# Patient Record
Sex: MALE | Race: BLACK OR AFRICAN AMERICAN | NOT HISPANIC OR LATINO | Employment: UNEMPLOYED | URBAN - METROPOLITAN AREA
[De-identification: names, ages, dates, MRNs, and addresses within clinical notes are randomized per-mention and may not be internally consistent; named-entity substitution may affect disease eponyms.]

---

## 2017-11-09 ENCOUNTER — HOSPITAL ENCOUNTER (EMERGENCY)
Facility: MEDICAL CENTER | Age: 22
End: 2017-11-09
Attending: EMERGENCY MEDICINE

## 2017-11-09 VITALS
OXYGEN SATURATION: 97 % | DIASTOLIC BLOOD PRESSURE: 80 MMHG | HEART RATE: 73 BPM | WEIGHT: 216.05 LBS | SYSTOLIC BLOOD PRESSURE: 135 MMHG | BODY MASS INDEX: 25.51 KG/M2 | HEIGHT: 77 IN | RESPIRATION RATE: 16 BRPM | TEMPERATURE: 98.4 F

## 2017-11-09 DIAGNOSIS — N34.2 URETHRITIS: ICD-10-CM

## 2017-11-09 DIAGNOSIS — Z71.1 CONCERN ABOUT STD IN MALE WITHOUT DIAGNOSIS: ICD-10-CM

## 2017-11-09 LAB
APPEARANCE UR: CLEAR
BILIRUB UR QL STRIP.AUTO: NEGATIVE
COLOR UR: YELLOW
GLUCOSE UR STRIP.AUTO-MCNC: NEGATIVE MG/DL
KETONES UR STRIP.AUTO-MCNC: NEGATIVE MG/DL
LEUKOCYTE ESTERASE UR QL STRIP.AUTO: NEGATIVE
MICRO URNS: NORMAL
NITRITE UR QL STRIP.AUTO: NEGATIVE
PH UR STRIP.AUTO: 6.5 [PH]
PROT UR QL STRIP: NEGATIVE MG/DL
RBC UR QL AUTO: NEGATIVE
SP GR UR STRIP.AUTO: 1.02
UROBILINOGEN UR STRIP.AUTO-MCNC: 0.2 MG/DL

## 2017-11-09 PROCEDURE — 99283 EMERGENCY DEPT VISIT LOW MDM: CPT

## 2017-11-09 PROCEDURE — 96372 THER/PROPH/DIAG INJ SC/IM: CPT

## 2017-11-09 PROCEDURE — 700111 HCHG RX REV CODE 636 W/ 250 OVERRIDE (IP): Performed by: EMERGENCY MEDICINE

## 2017-11-09 PROCEDURE — 700102 HCHG RX REV CODE 250 W/ 637 OVERRIDE(OP): Performed by: EMERGENCY MEDICINE

## 2017-11-09 PROCEDURE — 700101 HCHG RX REV CODE 250: Performed by: EMERGENCY MEDICINE

## 2017-11-09 PROCEDURE — A9270 NON-COVERED ITEM OR SERVICE: HCPCS | Performed by: EMERGENCY MEDICINE

## 2017-11-09 PROCEDURE — 87491 CHLMYD TRACH DNA AMP PROBE: CPT

## 2017-11-09 PROCEDURE — 81003 URINALYSIS AUTO W/O SCOPE: CPT

## 2017-11-09 PROCEDURE — 87591 N.GONORRHOEAE DNA AMP PROB: CPT

## 2017-11-09 RX ORDER — AZITHROMYCIN 250 MG/1
1000 TABLET, FILM COATED ORAL ONCE
Status: COMPLETED | OUTPATIENT
Start: 2017-11-09 | End: 2017-11-09

## 2017-11-09 RX ORDER — LIDOCAINE HYDROCHLORIDE 10 MG/ML
0.9 INJECTION, SOLUTION INFILTRATION; PERINEURAL ONCE
Status: COMPLETED | OUTPATIENT
Start: 2017-11-09 | End: 2017-11-09

## 2017-11-09 RX ORDER — CEFTRIAXONE SODIUM 250 MG/1
250 INJECTION, POWDER, FOR SOLUTION INTRAMUSCULAR; INTRAVENOUS ONCE
Status: COMPLETED | OUTPATIENT
Start: 2017-11-09 | End: 2017-11-09

## 2017-11-09 RX ADMIN — CEFTRIAXONE SODIUM 250 MG: 250 INJECTION, POWDER, FOR SOLUTION INTRAMUSCULAR; INTRAVENOUS at 13:10

## 2017-11-09 RX ADMIN — AZITHROMYCIN 1000 MG: 250 TABLET, FILM COATED ORAL at 13:10

## 2017-11-09 RX ADMIN — LIDOCAINE HYDROCHLORIDE 0.9 ML: 10 INJECTION, SOLUTION INFILTRATION; PERINEURAL at 13:10

## 2017-11-09 NOTE — DISCHARGE INSTRUCTIONS
Sexually Transmitted Disease  A sexually transmitted disease (STD) is a disease or infection often passed to another person during sex. However, STDs can be passed through nonsexual ways. An STD can be passed through:  · Spit (saliva).  · Semen.  · Blood.  · Mucus from the vagina.  · Pee (urine).  HOW CAN I LESSEN MY CHANCES OF GETTING AN STD?  · Use:  ¨ Latex condoms.  ¨ Water-soluble lubricants with condoms. Do not use petroleum jelly or oils.  ¨ Dental dams. These are small pieces of latex that are used as a barrier during oral sex.  · Avoid having more than one sex partner.  · Do not have sex with someone who has other sex partners.  · Do not have sex with anyone you do not know or who is at high risk for an STD.  · Avoid risky sex that can break your skin.  · Do not have sex if you have open sores on your mouth or skin.  · Avoid drinking too much alcohol or taking illegal drugs. Alcohol and drugs can affect your good judgment.  · Avoid oral and anal sex acts.  · Get shots (vaccines) for HPV and hepatitis.  · If you are at risk of being infected with HIV, it is advised that you take a certain medicine daily to prevent HIV infection. This is called pre-exposure prophylaxis (PrEP). You may be at risk if:  ¨ You are a man who has sex with other men (MSM).  ¨ You are attracted to the opposite sex (heterosexual) and are having sex with more than one partner.  ¨ You take drugs with a needle.  ¨ You have sex with someone who has HIV.  · Talk with your doctor about if you are at high risk of being infected with HIV. If you begin to take PrEP, get tested for HIV first. Get tested every 3 months for as long as you are taking PrEP.  · Get tested for STDs every year if you are sexually active. If you are treated for an STD, get tested again 3 months after you are treated.  WHAT SHOULD I DO IF I THINK I HAVE AN STD?  · See your doctor.  · Tell your sex partner(s) that you have an STD. They should be tested and treated.  · Do  not have sex until your doctor says it is okay.  WHEN SHOULD I GET HELP?  Get help right away if:  · You have bad belly (abdominal) pain.  · You are a man and have puffiness (swelling) or pain in your testicles.  · You are a woman and have puffiness in your vagina.     This information is not intended to replace advice given to you by your health care provider. Make sure you discuss any questions you have with your health care provider.     Document Released: 01/25/2006 Document Revised: 01/08/2016 Document Reviewed: 06/13/2014  [employer] Interactive Patient Education ©2016 [employer]      Consider getting tested for syphillis, HIV, and hepatitis in 6 weeks at the [facility]. Please follow-up with your primary care provider for blood pressure management.

## 2017-11-09 NOTE — ED NOTES
Chief Complaint   Patient presents with   • Painful Urination     x 2 days.  Pt reports he had unprotected intercourse and he would like to be checked for STD.    Pt to triage in NAD.  Pt educated on triage process and instructed to notify triage RN of any change in status.

## 2017-11-09 NOTE — ED PROVIDER NOTES
ED Provider Note    Scribed for Corazon Montelongo M.D. by Annette Wilkins. 11/9/2017, 12:16 PM.    Primary care provider: Pcp Pt states none  Means of arrival: Walk-in  History obtained from: Patient  History limited by: None    CHIEF COMPLAINT  Chief Complaint   Patient presents with   • Painful Urination     x 2 days.  Pt reports he had unprotected intercourse and he would like to be checked for STD.        HPI  Brooks Doll is a 22 y.o. male who presents to the Emergency Department for dysuria onset 2 days ago. The patient states he began developing symptoms after having unprotected sexual intercourse. He denies any penile lesions/rash or penile discharge. The patient came to the ED today requesting for STD testing. He states he has experienced similar symptoms of dysuria last year and was seen in the ED, but after receiving PO and IM antibiotics, he became increasingly dizzy and had a syncopal episode. The patient also reports of experiencing cold-like symptoms several weeks ago, but is feeling improved after taking OTC medications. He denies any abdominal pain or fever.  He has no allergies to medications.     REVIEW OF SYSTEMS  Pertinent positives include dysuria and improving cold-like symptoms. Pertinent negatives include no penile lesions/rash, penile discharge, abdominal pain, or fever.  See HPI for further details.   E.     PAST MEDICAL HISTORY  History reviewed. No pertinent past medical history.    SURGICAL HISTORY  Past Surgical History:   Procedure Laterality Date   • OTHER ORTHOPEDIC SURGERY      dr knee surg x 2,L elbow surg       SOCIAL HISTORY  Social History   Substance Use Topics   • Smoking status: Never Smoker   • Smokeless tobacco: Never Used   • Alcohol use No      History   Drug Use No     FAMILY HISTORY  History reviewed. No pertinent family history.    CURRENT MEDICATIONS  Home Medications     Reviewed by Soumya Russo R.N. (Registered Nurse) on 11/09/17 at 1157  [facility] List  "Status: Complete   Medication Last Dose Status        Patient Thang Taking any Medications                       ALLERGIES  No Known Allergies    PHYSICAL EXAM  VITAL SIGNS: /80   Pulse 73   Temp 36.9 °C (98.4 °F)   Resp 16   Ht 1.956 m (6' 5\")   Wt 98 kg (216 lb 0.8 oz)   SpO2 97%   BMI 25.62 kg/m²   Vitals reviewed.    Consitutional: Well-developed, well-nourished. Negative for: distress.  HENT: Normocephalic, right external ear normal, left external ear normal, oropharynx clear and moist.  Eyes: Conjunctivae normal, extraocular movements normal. Negative for: discharge in right and left eye, icterus.  Neck: Range of motion normal, supple.   Abdominal: Soft, bowel sounds normal. Negative for: distention, tenderness, rebound, guarding.  Musculoskeletal: Normal range of motion. Negative for edema.  Neurological: Alert and oriented x3. No focal deficits.  Skin: Warm, dry. Negative for rash.  Psych: Mood/affect normal, behavior normal, judgment normal.    LABS  Results for orders placed or performed during the hospital encounter of 11/09/17   CHLAMYDIA/GC PCR URINE OR SWAB   Result Value Ref Range    Source Urine    URINALYSIS (UA)   Result Value Ref Range    Color Yellow     Character Clear     Specific Gravity 1.019 <1.035    Ph 6.5 5.0 - 8.0    Glucose Negative Negative mg/dL    Ketones Negative Negative mg/dL    Protein Negative Negative mg/dL    Bilirubin Negative Negative    Urobilinogen, Urine 0.2 Negative    Nitrite Negative Negative    Leukocyte Esterase Negative Negative    Occult Blood Negative Negative    Micro Urine Req see below    All labs reviewed by me.     COURSE & MEDICAL DECISION MAKING  Nursing notes, VS, PMSFHx reviewed in chart.    12:37 PM Ordered chlamydia/GC.    12:39 PM Patient seen and examined at bedside. The patient presents with Dysuria and the differential diagnosis includes but is not limited to infectious ureteritis. Discussed with the patient that Chlamydia/GC are " "ordered in the ED, but if he would like to be tested for HIV, Syphilis, or Hepatitis, I recommended he be seen at the [facility] for blood work in 6 weeks and use condoms until then. The patient will be treated with Rocephin 250mg and Zithromax 1g and monitored for any syncopal episodes. The patient will be discharged and should return if symptoms worsen or if new symptoms arise. The patient understands and agrees to plan. /80   Pulse 73   Temp 36.9 °C (98.4 °F)   Resp 16   Ht 1.956 m (6' 5\")   Wt 98 kg (216 lb 0.8 oz)   SpO2 97%   BMI 25.62 kg/m²      The patient will return for new or worsening symptoms and is stable at the time of discharge.    The patient is referred to a primary physician for blood pressure management, diabetic screening, and for all other preventative health concerns.    DISPOSITION:  Patient will be discharged home in stable condition.    FOLLOW UP:  [facility], Emergency Dept  [facility] [facility] [address] [phone]    If symptoms worsen    FINAL IMPRESSION  1. Urethritis    2. Concern about STD in male without diagnosis       Annette BISHOP (Jg), am scribing for, and in the presence of, Corazon Montelongo M.D..    Electronically signed by: Annette Wilkins (Jg), 11/9/2017    Corazon BISHOP M.D. personally performed the services described in this documentation, as scribed by Annette Wilkins in my presence, and it is both accurate and complete.    The note accurately reflects work and decisions made by me.  Corazon Montelongo  11/9/2017  4:51 PM    "

## 2017-11-10 LAB
C TRACH DNA SPEC QL NAA+PROBE: NEGATIVE
N GONORRHOEA DNA SPEC QL NAA+PROBE: NEGATIVE
SPECIMEN SOURCE: NORMAL

## 2018-05-13 ENCOUNTER — HOSPITAL ENCOUNTER (EMERGENCY)
Facility: MEDICAL CENTER | Age: 23
End: 2018-05-13
Attending: EMERGENCY MEDICINE

## 2018-05-13 VITALS
TEMPERATURE: 99.4 F | HEIGHT: 77 IN | BODY MASS INDEX: 26.73 KG/M2 | WEIGHT: 226.41 LBS | DIASTOLIC BLOOD PRESSURE: 80 MMHG | HEART RATE: 74 BPM | RESPIRATION RATE: 18 BRPM | SYSTOLIC BLOOD PRESSURE: 144 MMHG | OXYGEN SATURATION: 97 %

## 2018-05-13 DIAGNOSIS — R36.9 PENILE DISCHARGE: ICD-10-CM

## 2018-05-13 DIAGNOSIS — N34.2 URETHRITIS: ICD-10-CM

## 2018-05-13 LAB
APPEARANCE UR: ABNORMAL
BACTERIA #/AREA URNS HPF: NEGATIVE /HPF
BILIRUB UR QL STRIP.AUTO: NEGATIVE
COLOR UR: YELLOW
EPI CELLS #/AREA URNS HPF: NEGATIVE /HPF
GLUCOSE UR STRIP.AUTO-MCNC: NEGATIVE MG/DL
HYALINE CASTS #/AREA URNS LPF: ABNORMAL /LPF
KETONES UR STRIP.AUTO-MCNC: NEGATIVE MG/DL
LEUKOCYTE ESTERASE UR QL STRIP.AUTO: ABNORMAL
MICRO URNS: ABNORMAL
NITRITE UR QL STRIP.AUTO: NEGATIVE
PH UR STRIP.AUTO: 6 [PH]
PROT UR QL STRIP: NEGATIVE MG/DL
RBC # URNS HPF: ABNORMAL /HPF
RBC UR QL AUTO: ABNORMAL
SP GR UR STRIP.AUTO: 1.02
UROBILINOGEN UR STRIP.AUTO-MCNC: 0.2 MG/DL
WBC #/AREA URNS HPF: ABNORMAL /HPF

## 2018-05-13 PROCEDURE — 96372 THER/PROPH/DIAG INJ SC/IM: CPT

## 2018-05-13 PROCEDURE — 700111 HCHG RX REV CODE 636 W/ 250 OVERRIDE (IP): Performed by: EMERGENCY MEDICINE

## 2018-05-13 PROCEDURE — 700102 HCHG RX REV CODE 250 W/ 637 OVERRIDE(OP): Performed by: EMERGENCY MEDICINE

## 2018-05-13 PROCEDURE — 81001 URINALYSIS AUTO W/SCOPE: CPT

## 2018-05-13 PROCEDURE — 87086 URINE CULTURE/COLONY COUNT: CPT

## 2018-05-13 PROCEDURE — 99284 EMERGENCY DEPT VISIT MOD MDM: CPT

## 2018-05-13 PROCEDURE — 87591 N.GONORRHOEAE DNA AMP PROB: CPT

## 2018-05-13 PROCEDURE — 87491 CHLMYD TRACH DNA AMP PROBE: CPT

## 2018-05-13 PROCEDURE — 700101 HCHG RX REV CODE 250: Performed by: EMERGENCY MEDICINE

## 2018-05-13 PROCEDURE — A9270 NON-COVERED ITEM OR SERVICE: HCPCS | Performed by: EMERGENCY MEDICINE

## 2018-05-13 RX ORDER — AZITHROMYCIN 250 MG/1
1000 TABLET, FILM COATED ORAL ONCE
Status: COMPLETED | OUTPATIENT
Start: 2018-05-13 | End: 2018-05-13

## 2018-05-13 RX ORDER — LIDOCAINE HYDROCHLORIDE 10 MG/ML
20 INJECTION, SOLUTION INFILTRATION; PERINEURAL ONCE
Status: COMPLETED | OUTPATIENT
Start: 2018-05-13 | End: 2018-05-13

## 2018-05-13 RX ORDER — CEFTRIAXONE SODIUM 250 MG/1
250 INJECTION, POWDER, FOR SOLUTION INTRAMUSCULAR; INTRAVENOUS ONCE
Status: COMPLETED | OUTPATIENT
Start: 2018-05-13 | End: 2018-05-13

## 2018-05-13 RX ADMIN — CEFTRIAXONE SODIUM 250 MG: 250 INJECTION, POWDER, FOR SOLUTION INTRAMUSCULAR; INTRAVENOUS at 19:47

## 2018-05-13 RX ADMIN — AZITHROMYCIN 1000 MG: 250 TABLET, FILM COATED ORAL at 19:36

## 2018-05-13 RX ADMIN — LIDOCAINE HYDROCHLORIDE 0.9 ML: 10 INJECTION, SOLUTION EPIDURAL; INFILTRATION; INTRACAUDAL; PERINEURAL at 19:47

## 2018-05-13 ASSESSMENT — LIFESTYLE VARIABLES: DO YOU DRINK ALCOHOL: NO

## 2018-05-13 ASSESSMENT — PAIN SCALES - GENERAL: PAINLEVEL_OUTOF10: 8

## 2018-05-13 NOTE — LETTER
5/16/2018               Brooks Doll  [address] [address]        Dear Brooks (MR#[id number])    As we have been unable to contact you by phone, this letter is sent in regards to your, recent visit to the [facility] Emergency Department on 5/13/2018.  During the visit, tests were performed to assist the physician in a medical diagnosis.  A review of those tests requires that we notify you of the following:    Your culture test was positive for Gonorrhea and Chlamydia, a sexually transmitted infection. This was already treated appropriately in the Emergency Department.       Based on the above findings it is recommended that you seek testing for the presence of additional sexually transmitted infections from the Health Department. Also, it is advised that you inform your sexual partner(s) within the previous 60 days of the above findings and direct them to the Health Department for testing. Also, please refrain from sexual activity for at least 7 days after treatment. Should your symptoms progress, it is important that you follow up with your primary care physician, your local urgent care office, or return to the emergency department for further work up in order to prevent long term health issues.      Thank you for your cooperation in the matter.    Sincerely,  ED Culture Follow-Up Staff  Dawit Yarbrough, PharmD, BCPS    [facility], Emergency Department  [facility], [place] [id number] [phone]

## 2018-05-14 LAB
C TRACH DNA SPEC QL NAA+PROBE: POSITIVE
N GONORRHOEA DNA SPEC QL NAA+PROBE: POSITIVE
SPECIMEN SOURCE: ABNORMAL

## 2018-05-14 NOTE — DISCHARGE INSTRUCTIONS
Sexually Transmitted Disease  A sexually transmitted disease (STD) is a disease or infection that may be passed (transmitted) from person to person, usually during sexual activity. This may happen by way of saliva, semen, blood, vaginal mucus, or urine. Common STDs include:  · Gonorrhea.  · Chlamydia.  · Syphilis.  · HIV and AIDS.  · Genital herpes.  · Hepatitis B and C.  · Trichomonas.  · Human papillomavirus (HPV).  · Pubic lice.  · Scabies.  · Mites.  · Bacterial vaginosis.  What are the causes?  An STD may be caused by bacteria, a virus, or parasites. STDs are often transmitted during sexual activity if one person is infected. However, they may also be transmitted through nonsexual means. STDs may be transmitted after:  · Sexual intercourse with an infected person.  · Sharing sex toys with an infected person.  · Sharing needles with an infected person or using unclean piercing or tattoo needles.  · Having intimate contact with the genitals, mouth, or rectal areas of an infected person.  · Exposure to infected fluids during birth.  What are the signs or symptoms?  Different STDs have different symptoms. Some people may not have any symptoms. If symptoms are present, they may include:  · Painful or bloody urination.  · Pain in the pelvis, abdomen, vagina, anus, throat, or eyes.  · A skin rash, itching, or irritation.  · Growths, ulcerations, blisters, or sores in the genital and anal areas.  · Abnormal vaginal discharge with or without bad odor.  · Penile discharge in men.  · Fever.  · Pain or bleeding during sexual intercourse.  · Swollen glands in the groin area.  · Yellow skin and eyes (jaundice). This is seen with hepatitis.  · Swollen testicles.  · Infertility.  · Sores and blisters in the mouth.  How is this diagnosed?  To make a diagnosis, your health care provider may:  · Take a medical history.  · Perform a physical exam.  · Take a sample of any discharge to examine.  · Swab the throat, cervix, opening to  the penis, rectum, or vagina for testing.  · Test a sample of your first morning urine.  · Perform blood tests.  · Perform a Pap test, if this applies.  · Perform a colposcopy.  · Perform a laparoscopy.  How is this treated?  Treatment depends on the STD. Some STDs may be treated but not cured.  · Chlamydia, gonorrhea, trichomonas, and syphilis can be cured with antibiotic medicine.  · Genital herpes, hepatitis, and HIV can be treated, but not cured, with prescribed medicines. The medicines lessen symptoms.  · Genital warts from HPV can be treated with medicine or by freezing, burning (electrocautery), or surgery. Warts may come back.  · HPV cannot be cured with medicine or surgery. However, abnormal areas may be removed from the cervix, vagina, or vulva.  · If your diagnosis is confirmed, your recent sexual partners need treatment. This is true even if they are symptom-free or have a negative culture or evaluation. They should not have sex until their health care providers say it is okay.  · Your health care provider may test you for infection again 3 months after treatment.  How is this prevented?  Take these steps to reduce your risk of getting an STD:  · Use latex condoms, dental dams, and water-soluble lubricants during sexual activity. Do not use petroleum jelly or oils.  · Avoid having multiple sex partners.  · Do not have sex with someone who has other sex partners.  · Do not have sex with anyone you do not know or who is at high risk for an STD.  · Avoid risky sex practices that can break your skin.  · Do not have sex if you have open sores on your mouth or skin.  · Avoid drinking too much alcohol or taking illegal drugs. Alcohol and drugs can affect your judgment and put you in a vulnerable position.  · Avoid engaging in oral and anal sex acts.  · Get vaccinated for HPV and hepatitis. If you have not received these vaccines in the past, talk to your health care provider about whether one or both might be  right for you.  · If you are at risk of being infected with HIV, it is recommended that you take a prescription medicine daily to prevent HIV infection. This is called pre-exposure prophylaxis (PrEP). You are considered at risk if:  ¨ You are a man who has sex with other men (MSM).  ¨ You are a heterosexual man or woman and are sexually active with more than one partner.  ¨ You take drugs by injection.  ¨ You are sexually active with a partner who has HIV.  · Talk with your health care provider about whether you are at high risk of being infected with HIV. If you choose to begin PrEP, you should first be tested for HIV. You should then be tested every 3 months for as long as you are taking PrEP.  Contact a health care provider if:  · See your health care provider.  · Tell your sexual partner(s). They should be tested and treated for any STDs.  · Do not have sex until your health care provider says it is okay.  Get help right away if:  Contact your health care provider right away if:  · You have severe abdominal pain.  · You are a man and notice swelling or pain in your testicles.  · You are a woman and notice swelling or pain in your vagina.  This information is not intended to replace advice given to you by your health care provider. Make sure you discuss any questions you have with your health care provider.  Document Released: 03/09/2004 Document Revised: 07/07/2017 Document Reviewed: 07/08/2014  [employer] Interactive Patient Education © 2017 [employer]

## 2018-05-14 NOTE — ED PROVIDER NOTES
"ED Provider Note    CHIEF COMPLAINT  Chief Complaint   Patient presents with   • Penile Discharge     Yellow discharge.  Penile swelling.    • Painful Urination       HPI  Brooks Doll is a 23 y.o. male who presents for evaluation of penile discharge and dysuria over the past few days, has a sexual partner although denies any sort of unprotected sex, he does however report that sexual partner has a vaginal discharge. No fever, no abdominal pain but had bilateral groin pain, no testicular pain, no hematuria, no diarrhea, no other complaints    REVIEW OF SYSTEMS  Negative for fever, abdominal pain    PAST MEDICAL HISTORY       SOCIAL HISTORY  Social History     Social History Main Topics   • Smoking status: Never Smoker   • Smokeless tobacco: Never Used   • Alcohol use No   • Drug use: No   • Sexual activity: Not on file       SURGICAL HISTORY   has a past surgical history that includes other orthopedic surgery.    CURRENT MEDICATIONS  I personally reviewed the medication list in the charting documentation.     ALLERGIES  Allergies   Allergen Reactions   • Peanut-Derived      Other reaction(s): Vomiting       PHYSICAL EXAM  VITAL SIGNS: /83   Pulse 72   Temp 37.4 °C (99.4 °F)   Resp 16   Ht 1.956 m (6' 5\")   Wt 102.7 kg (226 lb 6.6 oz)   SpO2 98%   BMI 26.85 kg/m²   Constitutional: Alert in no apparent distress.  HENT: No signs of trauma.   Eyes: Conjunctiva normal, Non-icteric.   Chest: Normal nonlabored respirations  Skin: No erythema, No rash.   : Uncircumcised penis, a large amount of a yellow discharge from the urethral meatus  Musculoskeletal: Good range of motion in all major joints.   Neurologic: Alert, No focal deficits noted.   Psychiatric: Affect normal, Judgment normal.    DIAGNOSTIC STUDIES / PROCEDURES    LABS  Results for orders placed or performed during the hospital encounter of 05/13/18   CHLAMYDIA/GC PCR URINE OR SWAB   Result Value Ref Range    Source Urine  "   URINALYSIS,CULTURE IF INDICATED   Result Value Ref Range    Color Yellow     Character Cloudy (A)     Specific Gravity 1.018 <1.035    Ph 6.0 5.0 - 8.0    Glucose Negative Negative mg/dL    Ketones Negative Negative mg/dL    Protein Negative Negative mg/dL    Bilirubin Negative Negative    Urobilinogen, Urine 0.2 Negative    Nitrite Negative Negative    Leukocyte Esterase Large (A) Negative    Occult Blood Small (A) Negative    Micro Urine Req Microscopic    URINE MICROSCOPIC (W/UA)   Result Value Ref Range    WBC Packed (A) /hpf    RBC 10-20 (A) /hpf    Bacteria Negative None /hpf    Epithelial Cells Negative /hpf    Hyaline Cast 3-5 (A) /lpf         COURSE & MEDICAL DECISION MAKING  Pertinent Labs & Imaging studies reviewed. (See chart for details)    Encounter Summary: This is a 23 y.o. male with penile discharge and a sexual partner also has some vaginal discharge, he has obvious discharge on exam, will be treated presumptively for GC/chlamydia with azithromycin and ceftriaxone, a confirmatory sample will be sent to the lab. Recommend a follow-up with the health department for further testing for sexually transmitted infections and have also recommended that he and his partner abstained from any sort of sexual contact until she also seeks treatment      DISPOSITION: Discharge Home      FINAL IMPRESSION  1. Urethritis    2. Penile discharge        This dictation was created using voice recognition software. The accuracy of the dictation is limited to the abilities of the software. I expect there may be some errors of grammar and possibly content. The nursing notes were reviewed and certain aspects of this information were incorporated into this note.    Electronically signed by: Hussein Tom, 5/13/2018 7:10 PM

## 2018-05-14 NOTE — ED NOTES
Pt discharged home. Assessment complete. Pt ambulates self. VS reassessed. Pt verbalized undersanding discharge instructions. Pt medicated per MAR. Awaiting lidocaine from central pharm for final medication.

## 2018-05-14 NOTE — ED TRIAGE NOTES
"  Chief Complaint   Patient presents with   • Penile Discharge     Yellow discharge.  Penile swelling.    • Painful Urination     Patient ambulatory to triage for above.  Denies new sexual partners.      Given urine sample cup.    /83   Pulse 72   Temp 37.4 °C (99.4 °F)   Resp 16   Ht 1.956 m (6' 5\")   Wt 102.7 kg (226 lb 6.6 oz)   SpO2 98%   BMI 26.85 kg/m²     "

## 2018-05-16 LAB
BACTERIA UR CULT: NORMAL
SIGNIFICANT IND 70042: NORMAL
SITE SITE: NORMAL
SOURCE SOURCE: NORMAL

## 2018-05-16 NOTE — ED NOTES
"ED Positive Culture Follow-up/Notification Note:    Date: 05/16/2018     Patient seen in the ED on 5/13/2018 for penile discharge and dysuria over the past few days.   1. Urethritis    2. Penile discharge       There are no discharge medications for this patient.      Allergies: Peanut-derived     Vitals:    05/13/18 1720 05/13/18 1724 05/13/18 1940   BP: 152/83  144/80   Pulse: 72  74   Resp: 16 18   Temp: 37.4 °C (99.4 °F)     SpO2: 98%  97%   Weight:  102.7 kg (226 lb 6.6 oz)    Height:  1.956 m (6' 5\")        Final cultures:   Results     Procedure Component Value Units Date/Time    URINE CULTURE(NEW) [[id number]] Collected:  05/13/18 1820    Order Status:  Completed Specimen:  Urine Updated:  05/16/18 0951     Significant Indicator NEG     Source UR     Site --     Urine Culture Mixed skin pérez 10-50,000 cfu/mL    CHLAMYDIA/GC PCR URINE OR SWAB [[id number]]  (Abnormal) Collected:  05/13/18 1820    Order Status:  Completed Specimen:  Urine from Urine, First Catch Updated:  05/14/18 2021     Source Urine     C. trachomatis by PCR POSITIVE (A)     N. gonorrhoeae by PCR POSITIVE (A)    URINALYSIS,CULTURE IF INDICATED [[id number]]  (Abnormal) Collected:  05/13/18 1820    Order Status:  Completed Specimen:  Urine Updated:  05/13/18 1918     Color Yellow     Character Cloudy (A)     Specific Gravity 1.018     Ph 6.0     Glucose Negative mg/dL      Ketones Negative mg/dL      Protein Negative mg/dL      Bilirubin Negative     Urobilinogen, Urine 0.2     Nitrite Negative     Leukocyte Esterase Large (A)     Occult Blood Small (A)     Micro Urine Req Microscopic          Plan:   Positive for both Chlamydia and Gonorrhea by PCR.  Treated appropriately in the emergency department with azithromycin 1g PO x 1 and ceftriaxone 250 mg IM x 1.  Called patient, went straight to voicemail, left message.  Sent letter to patient notifying him of results. Also encouraged patient in letter to seek testing for additional STIs, to notify " all partners within the past 60 days, and to refrain from sexual activity for at least 7 days post-treatment.  If correspondence is returned, I will communicate all information above with the patient directly.     Dawit Yarbrough, CrisD, BCPS